# Patient Record
Sex: MALE | Race: WHITE | NOT HISPANIC OR LATINO | Employment: OTHER | URBAN - METROPOLITAN AREA
[De-identification: names, ages, dates, MRNs, and addresses within clinical notes are randomized per-mention and may not be internally consistent; named-entity substitution may affect disease eponyms.]

---

## 2019-09-24 ENCOUNTER — HOSPITAL ENCOUNTER (EMERGENCY)
Facility: HOSPITAL | Age: 74
Discharge: HOME | End: 2019-09-24
Attending: EMERGENCY MEDICINE
Payer: MEDICARE

## 2019-09-24 VITALS
SYSTOLIC BLOOD PRESSURE: 202 MMHG | DIASTOLIC BLOOD PRESSURE: 103 MMHG | RESPIRATION RATE: 22 BRPM | OXYGEN SATURATION: 99 % | HEART RATE: 56 BPM | WEIGHT: 270 LBS | TEMPERATURE: 98 F | HEIGHT: 67 IN | BODY MASS INDEX: 42.38 KG/M2

## 2019-09-24 DIAGNOSIS — S09.90XA CLOSED HEAD INJURY, INITIAL ENCOUNTER: ICD-10-CM

## 2019-09-24 DIAGNOSIS — S01.81XA LACERATION OF SKIN OF FOREHEAD, INITIAL ENCOUNTER: ICD-10-CM
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
## 2019-09-24 PROCEDURE — 25000003 PHARM REV CODE 250: Performed by: EMERGENCY MEDICINE

## 2019-09-24 PROCEDURE — 12013 RPR F/E/E/N/L/M 2.6-5.0 CM: CPT

## 2019-09-24 PROCEDURE — 99284 EMERGENCY DEPT VISIT MOD MDM: CPT | Mod: 25

## 2019-09-24 RX ORDER — LIDOCAINE HYDROCHLORIDE AND EPINEPHRINE 10; 10 MG/ML; UG/ML
1 INJECTION, SOLUTION INFILTRATION; PERINEURAL ONCE
Status: COMPLETED | OUTPATIENT
Start: 2019-09-24 | End: 2019-09-24

## 2019-09-24 RX ADMIN — LIDOCAINE HYDROCHLORIDE,EPINEPHRINE BITARTRATE 1 ML: 10; .01 INJECTION, SOLUTION INFILTRATION; PERINEURAL at 09:09

## 2019-09-24 RX ADMIN — BACITRACIN, NEOMYCIN, POLYMYXIN B 1 EACH: 400; 3.5; 5 OINTMENT TOPICAL at 10:09

## 2019-09-25 NOTE — ED PROVIDER NOTES
Encounter Date: 9/24/2019    SCRIBE #1 NOTE: I, Vadim Coyle, am scribing for, and in the presence of, Jimmy Rodriguez MD. Other sections scribed: HPI, ROS, PE.       History     Chief Complaint   Patient presents with    Fall     trip and fall tonight while walking,  2 inch lac above left eye, denies LOC, + blood thinners       This is a 73 y.o. male who presents to the ED complaining of a post fall injury that occurred tonight while the pt was walking down some steps. He landed flat on his front side hitting his head on the ground. Reports 4 cm Y-shaped laceration above the left eye at level of eyebrow. Pain rated 4/10. He took an ASA pta with some relief. Denies any LOC, dizziness, jaw pain, neck pain ,back pain, or any other worsening or alleviating factors. His last tetanus shot was 8 mo ago. NKDA. The pt does not smoke. Denies any drug or alcohol use.  No focal numbness or weakness.     The history is provided by the patient and medical records.     Review of patient's allergies indicates:  No Known Allergies  No past medical history on file.  No past surgical history on file.  No family history on file.  Social History     Tobacco Use    Smoking status: Not on file   Substance Use Topics    Alcohol use: Not on file    Drug use: Not on file     Review of Systems   Constitutional: Negative for diaphoresis.   HENT: Negative for congestion, dental problem, ear pain, nosebleeds and sore throat.    Eyes: Negative.  Negative for pain and visual disturbance.   Respiratory: Negative for shortness of breath.    Cardiovascular: Negative for chest pain.   Gastrointestinal: Negative for abdominal pain, nausea and vomiting.        NO melena or rectal bleeding   Genitourinary: Negative for dysuria, flank pain, frequency and testicular pain.   Musculoskeletal: Negative for back pain and neck pain.   Skin: Positive for wound.   Neurological: Negative for dizziness, seizures, syncope, facial asymmetry, speech difficulty,  weakness, light-headedness, numbness and headaches.        No numbness   Psychiatric/Behavioral: Negative for confusion.   All other systems reviewed and are negative.      Physical Exam     Initial Vitals [09/24/19 2046]   BP Pulse Resp Temp SpO2   (!) 178/88 (!) 56 18 98.2 °F (36.8 °C) 99 %      MAP       --         Physical Exam    Nursing note and vitals reviewed.  Constitutional: He appears well-developed and well-nourished. He is not diaphoretic. No distress.   HENT:   Head: Normocephalic. Head is with laceration (2 in lac above left eye. Non purulent.).   Right Ear: External ear normal.   Left Ear: External ear normal.   Nose: Nose normal.   Mouth/Throat: Oropharynx is clear and moist.   Abrasion over bridge of nose. No bony tenderness in nose. No deformity.  No epistaxis.  Moderate swelling the superior left periorbital area.  Hematoma present.  There is a 4 cm Y-shaped irregular laceration involving the forehead and left eyebrow.  No foreign bodies.  Venous bleeding noted. No arterial bleeding.  No dental injury. No mandibular injury.   Eyes: Conjunctivae and EOM are normal. Pupils are equal, round, and reactive to light. Right eye exhibits no discharge. Left eye exhibits no discharge.   Neck: Neck supple. No tracheal deviation present.   No cervical spine tenderness.   Cardiovascular: Normal rate, regular rhythm and normal heart sounds.   No murmur heard.  Pulmonary/Chest: Breath sounds normal. No respiratory distress. He has no wheezes. He has no rhonchi. He has no rales. He exhibits no tenderness.   Abdominal: Soft. He exhibits no distension. There is no tenderness. There is no rebound and no guarding.   Musculoskeletal: Normal range of motion. He exhibits no edema or tenderness.   Neurological: He is alert and oriented to person, place, and time. He has normal strength. No cranial nerve deficit or sensory deficit. GCS score is 15. GCS eye subscore is 4. GCS verbal subscore is 5. GCS motor subscore is 6.  "  Skin: Skin is warm and dry. No rash noted.   Psychiatric: He has a normal mood and affect. His behavior is normal. Judgment and thought content normal.         ED Course   Lac Repair  Date/Time: 2019 8:59 PM  Performed by: Jimmy Rodriguez MD  Authorized by: Jimmy Rodriguez MD   Consent Done: Yes  Consent: Verbal consent obtained.  Risks and benefits: risks, benefits and alternatives were discussed  Consent given by: patient  Patient understanding: patient states understanding of the procedure being performed  Patient consent: the patient's understanding of the procedure matches consent given  Procedure consent: procedure consent matches procedure scheduled  Relevant documents: relevant documents present and verified  Test results: test results available and properly labeled  Site marked: the operative site was marked  Imaging studies: imaging studies available  Required items: required blood products, implants, devices, and special equipment available  Patient identity confirmed: , name, verbally with patient, MRN and provided demographic data  Time out: Immediately prior to procedure a "time out" was called to verify the correct patient, procedure, equipment, support staff and site/side marked as required.  Body area: head/neck  Location details: left eyebrow  Laceration length: 4 cm  Foreign bodies: no foreign bodies  Tendon involvement: none  Nerve involvement: none  Vascular damage: no  Anesthesia: local infiltration    Anesthesia:  Local anesthesia used: yes  Local Anesthetic: lidocaine 1% with epinephrine  Patient sedated: no  Preparation: Patient was prepped and draped in the usual sterile fashion.  Irrigation solution: saline  Irrigation method: syringe  Amount of cleaning: standard  Debridement: none  Degree of undermining: none  Skin closure: 5-0 nylon  Subcutaneous closure: 5-0 Vicryl  Number of sutures: 15  Technique: simple  Approximation: close  Approximation difficulty: " simple  Dressing: 4x4 sterile gauze and antibiotic ointment  Patient tolerance: Patient tolerated the procedure well with no immediate complications        Labs Reviewed - No data to display       Imaging Results          CT Head Without Contrast (Final result)  Result time 09/24/19 21:44:04    Final result by Jez Gonzalez MD (09/24/19 21:44:04)                 Impression:      No acute intracranial abnormalities identified.      Electronically signed by: Jez Gonzalez MD  Date:    09/24/2019  Time:    21:44             Narrative:    EXAMINATION:  CT HEAD WITHOUT CONTRAST    CLINICAL HISTORY:  Headache, post trauma;    TECHNIQUE:  Low dose axial images were obtained through the head.  Coronal and sagittal reformations were also performed. Contrast was not administered.    COMPARISON:  None.    FINDINGS:  There is mild chronic microvascular ischemic change.  No evidence of acute/recent major vascular distribution cerebral infarction, intraparenchymal hemorrhage, or intra-axial space occupying lesion. The ventricular system is normal in size and configuration with no evidence of hydrocephalus. No effacement of the skull-base cisterns. No abnormal extra-axial fluid collections or blood products. Visualized paranasal sinuses and mastoid air cells are clear. The calvarium shows no significant abnormality.  Prominent left periorbital soft tissue swelling is visualized.                                 Medical Decision Making:   History:   Old Medical Records: I decided to obtain old medical records.  Differential Diagnosis:   Laceration, traumatic brain injury  Clinical Tests:   Radiological Study: Ordered and Reviewed  ED Management:  2150:  No intracranial injuries.  No skull fracture.  Patient may be discharged.  Instructed to have sutures removed in 7 days.  Instructed to elevate head and place ice for swelling.            Scribe Attestation:   Scribe #1: I performed the above scribed service and the  documentation accurately describes the services I performed. I attest to the accuracy of the note.               Clinical Impression:       ICD-10-CM ICD-9-CM   1. Fall, initial encounter W19.XXXA E888.9   2. Closed head injury, initial encounter S09.90XA 959.01   3. Laceration of skin of forehead, initial encounter S01.81XA 873.42        I, iJmmy Rodriguez MD, personally performed the services described in this documentation. All medical record entries made by the scribe were at my direction and in my presence.  I have reviewed the chart and agree that the record reflects my personal performance and is accurate and complete   Disposition:   Disposition: Discharged  Condition: Stable                        Jimmy Rodriguez MD  09/24/19 0372

## 2019-09-25 NOTE — ED TRIAGE NOTES
74 yo male presents to ED via EMS for lac superior to L eye r/t mechanical fall. GCS 15 PERRLA. Pt denies LOC. Denies bloodthinnerss. Pt takes 81mg ASA